# Patient Record
Sex: FEMALE
[De-identification: names, ages, dates, MRNs, and addresses within clinical notes are randomized per-mention and may not be internally consistent; named-entity substitution may affect disease eponyms.]

---

## 2024-01-24 ENCOUNTER — DASHBOARD ENCOUNTERS (OUTPATIENT)
Age: 24
End: 2024-01-24

## 2024-01-24 ENCOUNTER — OFFICE VISIT (OUTPATIENT)
Dept: URBAN - METROPOLITAN AREA CLINIC 80 | Facility: CLINIC | Age: 24
End: 2024-01-24
Payer: COMMERCIAL

## 2024-01-24 VITALS
TEMPERATURE: 97.3 F | HEART RATE: 90 BPM | BODY MASS INDEX: 42.68 KG/M2 | DIASTOLIC BLOOD PRESSURE: 107 MMHG | SYSTOLIC BLOOD PRESSURE: 169 MMHG | WEIGHT: 265.6 LBS | HEIGHT: 66 IN

## 2024-01-24 DIAGNOSIS — K59.09 CHRONIC CONSTIPATION: ICD-10-CM

## 2024-01-24 DIAGNOSIS — D64.89 ANEMIA DUE TO OTHER CAUSE: ICD-10-CM

## 2024-01-24 DIAGNOSIS — R14.0 ABDOMINAL BLOATING: ICD-10-CM

## 2024-01-24 DIAGNOSIS — K21.9 GASTROESOPHAGEAL REFLUX DISEASE, UNSPECIFIED WHETHER ESOPHAGITIS PRESENT: ICD-10-CM

## 2024-01-24 PROBLEM — 236069009: Status: ACTIVE | Noted: 2024-01-24

## 2024-01-24 PROBLEM — 235595009: Status: ACTIVE | Noted: 2024-01-24

## 2024-01-24 PROBLEM — 271737000: Status: ACTIVE | Noted: 2024-01-24

## 2024-01-24 PROCEDURE — 99204 OFFICE O/P NEW MOD 45 MIN: CPT | Performed by: STUDENT IN AN ORGANIZED HEALTH CARE EDUCATION/TRAINING PROGRAM

## 2024-01-24 RX ORDER — MULTIVITAMIN
1 TABLET TABLET ORAL ONCE A DAY
Status: ACTIVE | COMMUNITY

## 2024-01-24 NOTE — HPI-TODAY'S VISIT:
Ms. Hernandez tells me that she has had GERD her entire life. Symptoms characterized by regurgitation, heart burn, belching, and migraines. She also reports nausea and dizziness. Stopped Ozempic 6 weeks ago as it worsening these symptoms. Still has occasional dizziness when not eating. She noticed tenderness and intermittent swelling in her cervial/submandibular region. She has ENT who ordered CT which showed two enlarged LN. He was not worried and recommended seeing dentist as teeth issues may becausing ear pain and headaches (does not have dentist and grinds teeth). EGD five years in [place] said she had lots of "scarring in her esophagus." Currently using TUMS as needed. She does not consume much caffeine, alcohol, spicy foods. Feels bloated and disteneded very frequently with many foods, including fruit.   She also reports chronic constipation, BSS 1 which improved with magnesium supplement. Takes iron sporadically for anemia. Great gradmother had colon cancer. No other known GI malignancy

## 2024-02-27 ENCOUNTER — EGD (OUTPATIENT)
Dept: URBAN - METROPOLITAN AREA SURGERY CENTER 19 | Facility: SURGERY CENTER | Age: 24
End: 2024-02-27

## 2024-02-28 ENCOUNTER — EGD (OUTPATIENT)
Dept: URBAN - METROPOLITAN AREA SURGERY CENTER 19 | Facility: SURGERY CENTER | Age: 24
End: 2024-02-28